# Patient Record
Sex: FEMALE | Race: WHITE | NOT HISPANIC OR LATINO | URBAN - METROPOLITAN AREA
[De-identification: names, ages, dates, MRNs, and addresses within clinical notes are randomized per-mention and may not be internally consistent; named-entity substitution may affect disease eponyms.]

---

## 2020-11-04 ENCOUNTER — IMPORTED ENCOUNTER (OUTPATIENT)
Dept: URBAN - METROPOLITAN AREA CLINIC 9 | Facility: CLINIC | Age: 60
End: 2020-11-04

## 2020-12-11 ENCOUNTER — IMPORTED ENCOUNTER (OUTPATIENT)
Dept: URBAN - METROPOLITAN AREA CLINIC 9 | Facility: CLINIC | Age: 60
End: 2020-12-11

## 2020-12-21 ENCOUNTER — IMPORTED ENCOUNTER (OUTPATIENT)
Dept: URBAN - METROPOLITAN AREA CLINIC 9 | Facility: CLINIC | Age: 60
End: 2020-12-21

## 2021-01-08 ENCOUNTER — IMPORTED ENCOUNTER (OUTPATIENT)
Dept: URBAN - METROPOLITAN AREA CLINIC 9 | Facility: CLINIC | Age: 61
End: 2021-01-08

## 2021-01-20 ENCOUNTER — IMPORTED ENCOUNTER (OUTPATIENT)
Dept: URBAN - METROPOLITAN AREA CLINIC 9 | Facility: CLINIC | Age: 61
End: 2021-01-20

## 2021-04-08 NOTE — PROCEDURE NOTE: CLINICAL
PROCEDURE NOTE: Avastin () #1 OD. Diagnosis: Proliferative Diabetic Retinopathy. Anesthesia: Topical. Prep: Betadine Drops. Prior to injection, risks/benefits/alternatives discussed including infection, loss of vision, hemorrhage, cataract, glaucoma, retinal tears or detachment. The off-label status of Intravitreal Avastin also was reviewed. The patient wished to proceed with treatment. Topical anesthesia was induced with Alcaine. Additional anesthesia was achieved using drop(s) or injection checked above. a drop of Povidone-iodine 5% ophthalmic solution was instilled over the injection site and in the inferior fornix. Betadine prep was performed. Using the syringe provided, Avastin 1.25 mg in 0.05 cc was injected into the vitreous cavity. The needle was passed 3.0 mm posterior to the limbus in pseudophakic patients, and 3.5 mm posterior to the lumbus in phakic patients. The remainder of the Avastin in the single-use vial was then discarded in a medical waste disposal container. Unused medication was discarded. Patient tolerated procedure well. There were no complications. Injection time: *. Post-op instructions given. Expiration Date: 6/22/2021. The patient was instructed to return for re-evaluation in approximately 4-12 weeks depending on his/her condition and was told to call immediately if vision decreases and/or if his/her eye becomes red, painful, and/or light sensitive. The patient was instructed to go to the emergency room or call 911 if unable to reach the doctor within an hour or two of trying or calling. The patient was instructed to use Artificial Tears q.i.d. p.r.n for comfort. Gardenia Akins PROCEDURE NOTE: Avastin () #1 OS. Diagnosis: Diabetic Macular Edema. Anesthesia: Topical. Prep: Betadine Drops. Prior to injection, risks/benefits/alternatives discussed including infection, loss of vision, hemorrhage, cataract, glaucoma, retinal tears or detachment. The off-label status of Intravitreal Avastin also was reviewed. The patient wished to proceed with treatment. Topical anesthesia was induced with Alcaine. Additional anesthesia was achieved using drop(s) or injection checked above. a drop of Povidone-iodine 5% ophthalmic solution was instilled over the injection site and in the inferior fornix. Betadine prep was performed. Using the syringe provided, Avastin 1.25 mg in 0.05 cc was injected into the vitreous cavity. The needle was passed 3.0 mm posterior to the limbus in pseudophakic patients, and 3.5 mm posterior to the lumbus in phakic patients. The remainder of the Avastin in the single-use vial was then discarded in a medical waste disposal container. Unused medication was discarded. Patient tolerated procedure well. There were no complications. Injection time: *. Post-op instructions given. Expiration Date: 06/22/21. The patient was instructed to return for re-evaluation in approximately 4-12 weeks depending on his/her condition and was told to call immediately if vision decreases and/or if his/her eye becomes red, painful, and/or light sensitive. The patient was instructed to go to the emergency room or call 911 if unable to reach the doctor within an hour or two of trying or calling. The patient was instructed to use Artificial Tears q.i.d. p.r.n for comfort. Gardenia Akins

## 2021-05-21 NOTE — PROCEDURE NOTE: CLINICAL
PROCEDURE NOTE: Avastin () OD. Diagnosis: Diabetic Macular Edema. Anesthesia: Lidocaine 4%. Prep: Betadine Drops. Prior to injection, risks/benefits/alternatives discussed including infection, loss of vision, hemorrhage, cataract, glaucoma, retinal tears or detachment. The off-label status of Intravitreal Avastin also was reviewed. The patient wished to proceed with treatment. Topical anesthesia was induced with Alcaine. Additional anesthesia was achieved using drop(s) or injection checked above. a drop of Povidone-iodine 5% ophthalmic solution was instilled over the injection site and in the inferior fornix. Betadine prep was performed. Using the syringe provided, Avastin 1.25 mg in 0.05 cc was injected into the vitreous cavity. The needle was passed 3.0 mm posterior to the limbus in pseudophakic patients, and 3.5 mm posterior to the lumbus in phakic patients. The remainder of the Avastin in the single-use vial was then discarded in a medical waste disposal container. Unused medication was discarded. Patient tolerated procedure well. There were no complications. Injection time: *. Post-op instructions given. Expiration Date: 6/24/2021. The patient was instructed to return for re-evaluation in approximately 4-12 weeks depending on his/her condition and was told to call immediately if vision decreases and/or if his/her eye becomes red, painful, and/or light sensitive. The patient was instructed to go to the emergency room or call 911 if unable to reach the doctor within an hour or two of trying or calling. The patient was instructed to use Artificial Tears q.i.d. p.r.n for comfort. Queen Ned PROCEDURE NOTE: Avastin () OS. Diagnosis: Diabetic Macular Edema. Anesthesia: Lidocaine 4%. Prep: Betadine Drops. Prior to injection, risks/benefits/alternatives discussed including infection, loss of vision, hemorrhage, cataract, glaucoma, retinal tears or detachment. The off-label status of Intravitreal Avastin also was reviewed. The patient wished to proceed with treatment. Topical anesthesia was induced with Alcaine. Additional anesthesia was achieved using drop(s) or injection checked above. a drop of Povidone-iodine 5% ophthalmic solution was instilled over the injection site and in the inferior fornix. Betadine prep was performed. Using the syringe provided, Avastin 1.25 mg in 0.05 cc was injected into the vitreous cavity. The needle was passed 3.0 mm posterior to the limbus in pseudophakic patients, and 3.5 mm posterior to the lumbus in phakic patients. The remainder of the Avastin in the single-use vial was then discarded in a medical waste disposal container. Unused medication was discarded. Patient tolerated procedure well. There were no complications. Injection time: *. Post-op instructions given. Expiration Date: 7/7/2021. The patient was instructed to return for re-evaluation in approximately 4-12 weeks depending on his/her condition and was told to call immediately if vision decreases and/or if his/her eye becomes red, painful, and/or light sensitive. The patient was instructed to go to the emergency room or call 911 if unable to reach the doctor within an hour or two of trying or calling. The patient was instructed to use Artificial Tears q.i.d. p.r.n for comfort. [facility]

## 2021-06-29 NOTE — PROCEDURE NOTE: CLINICAL
PROCEDURE NOTE: Avastin () OD. Diagnosis: Diabetic Macular Edema. Anesthesia: Topical. Prep: Betadine Drops. Prior to injection, risks/benefits/alternatives discussed including infection, loss of vision, hemorrhage, cataract, glaucoma, retinal tears or detachment. The off-label status of Intravitreal Avastin also was reviewed. The patient wished to proceed with treatment. Topical anesthesia was induced with Alcaine. Additional anesthesia was achieved using drop(s) or injection checked above. a drop of Povidone-iodine 5% ophthalmic solution was instilled over the injection site and in the inferior fornix. Betadine prep was performed. Using the syringe provided, Avastin 1.25 mg in 0.05 cc was injected into the vitreous cavity. The needle was passed 3.0 mm posterior to the limbus in pseudophakic patients, and 3.5 mm posterior to the lumbus in phakic patients. The remainder of the Avastin in the single-use vial was then discarded in a medical waste disposal container. Unused medication was discarded. Patient tolerated procedure well. There were no complications. Injection time: 133. Post-op instructions given. Expiration Date: 9/3/2021. The patient was instructed to return for re-evaluation in approximately 4-12 weeks depending on his/her condition and was told to call immediately if vision decreases and/or if his/her eye becomes red, painful, and/or light sensitive. The patient was instructed to go to the emergency room or call 911 if unable to reach the doctor within an hour or two of trying or calling. The patient was instructed to use Artificial Tears q.i.d. p.r.n for comfort. Christi Eaton PROCEDURE NOTE: Avastin () OS. Diagnosis: Diabetic Macular Edema. Anesthesia: Topical. Prep: Betadine Drops. Prior to injection, risks/benefits/alternatives discussed including infection, loss of vision, hemorrhage, cataract, glaucoma, retinal tears or detachment. The off-label status of Intravitreal Avastin also was reviewed. The patient wished to proceed with treatment. Topical anesthesia was induced with Alcaine. Additional anesthesia was achieved using drop(s) or injection checked above. a drop of Povidone-iodine 5% ophthalmic solution was instilled over the injection site and in the inferior fornix. Betadine prep was performed. Using the syringe provided, Avastin 1.25 mg in 0.05 cc was injected into the vitreous cavity. The needle was passed 3.0 mm posterior to the limbus in pseudophakic patients, and 3.5 mm posterior to the lumbus in phakic patients. The remainder of the Avastin in the single-use vial was then discarded in a medical waste disposal container. Unused medication was discarded. Patient tolerated procedure well. There were no complications. Injection time: 133. Post-op instructions given. Expiration Date: 8/30/2021. The patient was instructed to return for re-evaluation in approximately 4-12 weeks depending on his/her condition and was told to call immediately if vision decreases and/or if his/her eye becomes red, painful, and/or light sensitive. The patient was instructed to go to the emergency room or call 911 if unable to reach the doctor within an hour or two of trying or calling. The patient was instructed to use Artificial Tears q.i.d. p.r.n for comfort. Christi Eaton

## 2021-07-30 NOTE — PATIENT DISCUSSION
Based on today’s exam, diagnostic studies, and review of records, and the patient’s functional difficulty which appear to be a result of the macular pucker, the DETERMINATION WAS MADE FOR A VITRECTOMY with membrane peel and repair of the TRACTIONAL RETINAL DETACHMENT. Discussed benefits, alternatives, and risks of surgery including (but not limited to) cataract (if natural lens is still present), infection, bleeding, elevated intraocular pressure, double vision, retinal detachment, failure of retina to reattach, optic neuropathy, loss of vision, blindness, and loss of eye. Patient was told the vision may not return to the same level as prior to development of the membrane but should improve as the anatomy returns to a more normal contour. No prediction of the level of visual improvement and/or the degree of distortion reduction can be given.

## 2021-07-30 NOTE — PROCEDURE NOTE: CLINICAL
PROCEDURE NOTE: Avastin () OD. Diagnosis: Proliferative Diabetic Retinopathy. Anesthesia: Topical. Prep: Betadine Drops. Prior to injection, risks/benefits/alternatives discussed including infection, loss of vision, hemorrhage, cataract, glaucoma, retinal tears or detachment. The off-label status of Intravitreal Avastin also was reviewed. The patient wished to proceed with treatment. Topical anesthesia was induced with Alcaine. Additional anesthesia was achieved using drop(s) or injection checked above. a drop of Povidone-iodine 5% ophthalmic solution was instilled over the injection site and in the inferior fornix. Betadine prep was performed. Using the syringe provided, Avastin 1.25 mg in 0.05 cc was injected into the vitreous cavity. The needle was passed 3.0 mm posterior to the limbus in pseudophakic patients, and 3.5 mm posterior to the lumbus in phakic patients. The remainder of the Avastin in the single-use vial was then discarded in a medical waste disposal container. Unused medication was discarded. Patient tolerated procedure well. There were no complications. Injection time: 9:31am. Post-op instructions given. Expiration Date: 3092-08-23G65:00:00. The patient was instructed to return for re-evaluation in approximately 4-12 weeks depending on his/her condition and was told to call immediately if vision decreases and/or if his/her eye becomes red, painful, and/or light sensitive. The patient was instructed to go to the emergency room or call 911 if unable to reach the doctor within an hour or two of trying or calling. The patient was instructed to use Artificial Tears q.i.d. p.r.n for comfort. Rudolph Bar

## 2021-07-30 NOTE — PATIENT DISCUSSION
APPEARS VISUALLY SIGNIFICANT. DISCUSSED AT LENGTH W PT THE UNKNOWN/GUARDED VISUAL PROGNOSIS GIVEN DM & CONFIGURATION OF RD. LIKELY TO HAVE MULTIPLE PROCEDURES.

## 2021-08-06 NOTE — PATIENT DISCUSSION
8/6/21: GIVEN SEVERE TRACTION AND RHEGMATOGENOUS COMPONENT, WILL ADD PREDNISONE PO SHORT COURSE. Post-op instructions given. Discussed drops, positioning, no strenuous activity and eye protection. Call immediately if eye pain or loss of vision.

## 2021-09-15 ENCOUNTER — IMPORTED ENCOUNTER (OUTPATIENT)
Dept: URBAN - METROPOLITAN AREA CLINIC 9 | Facility: CLINIC | Age: 61
End: 2021-09-15

## 2021-10-16 ASSESSMENT — KERATOMETRY
OS_AXISANGLE_DEGREES: 4
OD_K1POWER_DIOPTERS: 38.5
OD_AXISANGLE_DEGREES: 173
OS_AXISANGLE_DEGREES: 6
OD_AXISANGLE2_DEGREES: 64
OS_K2POWER_DIOPTERS: 41.75
OD_AXISANGLE_DEGREES: 154
OS_K1POWER_DIOPTERS: 39
OD_K2POWER_DIOPTERS: 39
OD_AXISANGLE2_DEGREES: 83
OS_AXISANGLE2_DEGREES: 96
OD_K2POWER_DIOPTERS: 39.5
OS_K1POWER_DIOPTERS: 40
OS_AXISANGLE2_DEGREES: 94
OS_K2POWER_DIOPTERS: 40.5
OD_K1POWER_DIOPTERS: 38.75

## 2021-10-16 ASSESSMENT — VISUAL ACUITY
OD_SC: 20/25 + SN
OS_CC: 20/40 + SN
OS_SC: 20/30 -2 SN
OD_CC: 20/20 SN
OS_SC: 20/40 SN
OD_CC: 20/400 SN
OS_SC: 20/40 + SN
OD_SC: 20/30 - SN
OD_SC: 20/25 -2 SN
OS_SC: 20/25 SN
OS_CC: CF 2FT SN
OS_CC: 20/20 - SN
OD_CC: 20/25 SN

## 2021-10-16 ASSESSMENT — TONOMETRY
OD_IOP_MMHG: 20
OS_IOP_MMHG: 17
OS_IOP_MMHG: 15
OD_IOP_MMHG: 15
OS_IOP_MMHG: 25
OD_IOP_MMHG: 12
OS_IOP_MMHG: 20
OS_IOP_MMHG: 26
OD_IOP_MMHG: 26
OD_IOP_MMHG: 17
OD_IOP_MMHG: 18
OS_IOP_MMHG: 14